# Patient Record
Sex: FEMALE | Race: WHITE | NOT HISPANIC OR LATINO | Employment: FULL TIME | URBAN - METROPOLITAN AREA
[De-identification: names, ages, dates, MRNs, and addresses within clinical notes are randomized per-mention and may not be internally consistent; named-entity substitution may affect disease eponyms.]

---

## 2021-04-01 ENCOUNTER — HISTORICAL (OUTPATIENT)
Dept: ADMINISTRATIVE | Facility: HOSPITAL | Age: 56
End: 2021-04-01

## 2021-04-01 LAB — SARS-COV-2 RNA RESP QL NAA+PROBE: NOT DETECTED

## 2021-06-08 ENCOUNTER — HISTORICAL (OUTPATIENT)
Dept: ADMINISTRATIVE | Facility: HOSPITAL | Age: 56
End: 2021-06-08

## 2021-06-08 LAB
ABS NEUT (OLG): 3.5 X10(3)/MCL (ref 2.1–9.2)
ALBUMIN SERPL-MCNC: 4.8 GM/DL (ref 3.4–5)
ALBUMIN/GLOB SERPL: 2.29 {RATIO} (ref 1.5–2.5)
ALP SERPL-CCNC: 101 UNIT/L (ref 38–126)
ALT SERPL-CCNC: 17 UNIT/L (ref 7–52)
AST SERPL-CCNC: 25 UNIT/L (ref 15–37)
BILIRUB SERPL-MCNC: 0.6 MG/DL (ref 0.2–1)
BILIRUBIN DIRECT+TOT PNL SERPL-MCNC: 0.1 MG/DL (ref 0–0.5)
BILIRUBIN DIRECT+TOT PNL SERPL-MCNC: 0.5 MG/DL
BUN SERPL-MCNC: 10 MG/DL (ref 7–18)
CALCIUM SERPL-MCNC: 9.3 MG/DL (ref 8.5–10)
CHLORIDE SERPL-SCNC: 101 MMOL/L (ref 98–107)
CHOLEST SERPL-MCNC: 243 MG/DL (ref 0–200)
CHOLEST/HDLC SERPL: 4.7 {RATIO}
CO2 SERPL-SCNC: 31 MMOL/L (ref 21–32)
CREAT SERPL-MCNC: 0.73 MG/DL (ref 0.6–1.3)
ERYTHROCYTE [DISTWIDTH] IN BLOOD BY AUTOMATED COUNT: 12.7 % (ref 11.5–17)
EST CREAT CLEARANCE SER (OHS): 75.39 ML/MIN
GLOBULIN SER-MCNC: 2.1 GM/DL (ref 1.2–3)
GLUCOSE SERPL-MCNC: 99 MG/DL (ref 74–106)
HCT VFR BLD AUTO: 39.7 % (ref 37–47)
HDLC SERPL-MCNC: 52 MG/DL (ref 35–60)
HGB BLD-MCNC: 13.1 GM/DL (ref 12–16)
LDLC SERPL CALC-MCNC: 122 MG/DL (ref 0–129)
LYMPHOCYTES # BLD AUTO: 1.9 X10(3)/MCL (ref 0.6–3.4)
LYMPHOCYTES NFR BLD AUTO: 29.9 % (ref 13–40)
MCH RBC QN AUTO: 30.2 PG (ref 27–31.2)
MCHC RBC AUTO-ENTMCNC: 33 GM/DL (ref 32–36)
MCV RBC AUTO: 92 FL (ref 80–94)
MONOCYTES # BLD AUTO: 0.8 X10(3)/MCL (ref 0.1–1.3)
MONOCYTES NFR BLD AUTO: 12.9 % (ref 0.1–24)
NEUTROPHILS NFR BLD AUTO: 57.2 % (ref 47–80)
PLATELET # BLD AUTO: 266 X10(3)/MCL (ref 130–400)
PMV BLD AUTO: 8.5 FL (ref 9.4–12.4)
POTASSIUM SERPL-SCNC: 4.1 MMOL/L (ref 3.5–5.1)
PROT SERPL-MCNC: 6.9 GM/DL (ref 6.4–8.2)
RBC # BLD AUTO: 4.34 X10(6)/MCL (ref 4.2–5.4)
SODIUM SERPL-SCNC: 140 MMOL/L (ref 136–145)
TRIGL SERPL-MCNC: 354 MG/DL (ref 30–150)
TSH SERPL-ACNC: 1.75 MIU/ML (ref 0.35–4.94)
VLDLC SERPL CALC-MCNC: 70.8 MG/DL
WBC # SPEC AUTO: 6.2 X10(3)/MCL (ref 4.5–11.5)

## 2022-04-09 ENCOUNTER — HISTORICAL (OUTPATIENT)
Dept: ADMINISTRATIVE | Facility: HOSPITAL | Age: 57
End: 2022-04-09

## 2022-04-29 VITALS
SYSTOLIC BLOOD PRESSURE: 122 MMHG | WEIGHT: 125 LBS | DIASTOLIC BLOOD PRESSURE: 84 MMHG | BODY MASS INDEX: 23 KG/M2 | OXYGEN SATURATION: 97 % | HEIGHT: 62 IN

## 2022-05-02 NOTE — HISTORICAL [facility] CERNER
This is a historical note converted from [employer]. Formatting and pictures may have been removed.  Please reference [employer] for original formatting and attached multimedia. Chief Complaint  NP ESTABLISH  History of Present Illness  55?year old female presents for wellness visit.  Blood Pressure - 122/78  BMI - 22.23  Immunizations -discussed Shingrix?although patient deferred?to another time.  Breast Cancer Screening - up to date, Sees Dr. Spangler  Cervical Cancer Screening -?up to date, Sees Dr. Spangler  Colon Cancer Screening - Screening colonoscopy 2020 with Dr. Feliz repeat in 10 years  Diet - Average  Exercise - Minimal  Followed by Cardiologist - Dr. Munoz  Hx of cardiomyopathy viral etiology.??Stable shortness of breath.  GERD - Hx of hiatal hernia.? Controlled with Dexilant 60 mg daily.  Hx of TIA 2017.??Cryptogenic?per patient?assessment.  Dermatology - Dr. Presley-Dominic  Hx of cervcial DDD.? Denies any current cervical pain or radicular symptoms.  Basal cell left paranasal aspect s/p excision Feb 2021.?  Review of Systems  Constitutional:?No weight loss, no fever, no fatigue, no chills, no night sweats,?no weakness  Eyes:?No blurred vision,?no redness,?no drainage,?no ocular?pain  HEENT:?No sore throat,?no ear pain, no sinus pressure, no nasal congestion, no rhinorrhea, no postnasal drip  Respiratory:?No cough, no wheezing, no sputum production, no shortness of breath  Cardiovascular:?No chest pain, no palpitations, no dyspnea on exertion,?no orthopnea  Gastrointestinal:?No nausea, no vomiting, no abdominal pain, no diarrhea,?no constipation, no melena,?no hematochezia  Genitourinary:?No dysuria, no hematuria, no frequency, no urgency, no incontinence, no discharge  Musculoskeletal:?No myalgias, no arthralgias, no weakness, no joint effusion, no edema  Integumentary:?No rashes, no hives, no itching, no lesions, no jaundice  Neurologic:?No headaches, no numbness, no tingling, no weakness, no  dizziness  Psychiatric:?No anxiety, no irritability, no depression,?no suicidal ideations, no?homicidal ideations,?no delusions, no hallucinations  Endocrine:?No polyuria, no polydipsia, no polyphagia  Hematology:?No bruising, no lymphadenopathy,?no paleness  ?  Physical Exam  Vitals & Measurements  HR:?89(Peripheral)? BP:?122/78? SpO2:?97%?  HT:?158.00?cm? WT:?55.500?kg? BMI:?22.23?  General:?Well developed, Well-nourished, in No acute distress, A&O x 4  Eye:?PERRLA, EOMI, Clear conjunctiva, Eyelids unremarkable  Ears:?Bilateral EAC clear?and?Bilateral TM clear  Nose:? Mucosa normal, No rhinorrhea, No lesions  O/P:??No?erythema,?No tonsillar hypertrophy,?No tonsillar exudates,?No cobblestoning  Neck:?Soft, Nontender, No thyromegaly, Full range of motion, No cervical lymphadenopathy, No JVD  Cardiovascular:?Regular rate and rhythm, No murmurs, No gallops, No rubs  Respiratory:?Clear to auscultation bilaterally, No wheezes, No rhonchi, No?crackles  Abdomen:? Soft, Nontender, No hepatosplenomegaly, Normal and equal bowel sounds, No masses, No rebound, No guarding  Musculoskeletal:? No tenderness, FROM, No joint abnormality, No clubbing, No cyanosis,No?edema  Neurologic:? No motor or sensory deficits, Reflexes 2+ throughout, CN II-XII grossly intact  Integumentary:?No rashes or skin lesions  ?  Assessment/Plan  1.?Wellness examination?Z00.00  ?Discussed the?importance of maintaining a balanced diet and regular exercise  CBC, CMP, FLP, TSH today  Ordered:  Comprehensive Metabolic Panel, Routine collect, 06/08/21 14:33:00 CDT, Blood, Stop date 06/08/21 14:33:00 CDT, Lab Collect, Wellness examination, 06/08/21 14:33:00 CDT  Lipid Panel, Routine collect, 06/08/21 14:33:00 CDT, Blood, Stop date 06/08/21 14:33:00 CDT, Lab Collect, Wellness examination, 06/08/21 14:33:00 CDT  Preventative Health Care New 40-64 years [id number] PC, Wellness examination, [facility] - AFP, 06/08/21 14:32:00 CDT  Thyroid Stimulating Hormone, Routine  collect, 06/08/21 14:33:00 CDT, Blood, Stop date 06/08/21 14:33:00 CDT, Lab Collect, Wellness examination, 06/08/21 14:33:00 CDT  ?  2.?GERD - Gastro-esophageal reflux disease?K21.9  ?Well-controlled with Dexilant?60 mg daily  ?  3.?Cardiomyopathy?I42.9  ?Stable with current treatment  Continue routine cardiology follow-up  ?   Problem List/Past Medical History  Ongoing  Cardiomyopathy  GERD - Gastro-esophageal reflux disease  H/O: TIA  Hiatal hernia  Historical  No qualifying data  Procedure/Surgical History  Cholecystectomy (2017)   Medications  aspirin 81 mg oral tablet, 81 mg= 1 tab(s), Oral, Daily  CARVEDILOL TAB 6.25MG, 6.25 mg= 1 tab(s), Oral, BID  DEXILANT CAP 60MG DR, 60 mg= 1 cap(s), Oral, Daily  DIGOXIN TAB 0.125MG, 125 mcg= 1 tab(s), Oral, Daily  FUROSEMIDE TAB 20MG, 20 mg= 1 tab(s), Oral, Daily  KLOR-CON 10 TAB 10MEQ ER, 10 mEq= 1 tab(s), Oral, Daily  LISINOPRIL TAB 10MG, 10 mg= 1 tab(s), Oral, Daily  PRAVASTATIN TAB 40MG, 40 mg= 1 tab(s), Oral, qPM  progesterone 200 mg oral capsule, 200 mg= 1 cap(s), Oral, qPM  Allergies  penicillin?(Anaphylaxis)  Social History  Abuse/Neglect  No, 06/08/2021  Alcohol  Current, 1-2 times per week, 06/08/2021  Employment/School  Employed, Work/School description: [employer] 06/08/2021  Tobacco  Never (less than 100 in lifetime), N/A, 06/08/2021  Family History  CAD - Coronary artery disease: Father.  CVA - Cerebrovascular accident: Father.  Hyperlipidemia.: Brother and Brother.  Hypertension.: Mother, Brother and Brother.  Hypothyroidism.: Son.  MI - Myocardial infarction: Father.  Health Maintenance  Health Maintenance  ???Pending?(in the next year)  ??? ??OverDue  ??? ? ? ?Aspirin Therapy for CVD Prevention due??08/16/18??and every 1??year(s)  ??? ? ? ?Alcohol Misuse Screening due??01/02/21??and every 1??year(s)  ??? ??Due?  ??? ? ? ?ADL Screening due??06/08/21??and every 1??year(s)  ??? ? ? ?Tetanus Vaccine due??06/08/21??and every  10??year(s)  ??? ??Due In Future?  ??? ? ? ?Obesity Screening not due until??01/01/22??and every 1??year(s)  ???Satisfied?(in the past 1 year)  ??? ??Satisfied?  ??? ? ? ?Blood Pressure Screening on??06/08/21.??Satisfied by Lissette Renteria LPN  ??? ? ? ?Body Mass Index Check on??06/08/21.??Satisfied by Lissette Renteria LPN  ??? ? ? ?Cervical Cancer Screening on??01/05/21.??Satisfied by Sanjuanita Orosco  ??? ? ? ?Obesity Screening on??06/08/21.??Satisfied by Lissette Renteria LPN  ?